# Patient Record
Sex: FEMALE | Race: OTHER | NOT HISPANIC OR LATINO | URBAN - METROPOLITAN AREA
[De-identification: names, ages, dates, MRNs, and addresses within clinical notes are randomized per-mention and may not be internally consistent; named-entity substitution may affect disease eponyms.]

---

## 2021-03-24 ENCOUNTER — INPATIENT (INPATIENT)
Facility: HOSPITAL | Age: 61
LOS: 0 days | Discharge: HOME | DRG: 177 | End: 2021-03-25
Attending: FAMILY MEDICINE | Admitting: HOSPITALIST
Payer: MEDICAID

## 2021-03-24 VITALS
OXYGEN SATURATION: 96 % | TEMPERATURE: 100 F | HEIGHT: 63 IN | HEART RATE: 93 BPM | SYSTOLIC BLOOD PRESSURE: 154 MMHG | RESPIRATION RATE: 22 BRPM | DIASTOLIC BLOOD PRESSURE: 71 MMHG | WEIGHT: 199.96 LBS

## 2021-03-24 DIAGNOSIS — Z98.891 HISTORY OF UTERINE SCAR FROM PREVIOUS SURGERY: Chronic | ICD-10-CM

## 2021-03-24 DIAGNOSIS — Z98.890 OTHER SPECIFIED POSTPROCEDURAL STATES: Chronic | ICD-10-CM

## 2021-03-24 DIAGNOSIS — R74.01 ELEVATION OF LEVELS OF LIVER TRANSAMINASE LEVELS: ICD-10-CM

## 2021-03-24 DIAGNOSIS — J96.01 ACUTE RESPIRATORY FAILURE WITH HYPOXIA: ICD-10-CM

## 2021-03-24 DIAGNOSIS — U07.1 COVID-19: ICD-10-CM

## 2021-03-24 DIAGNOSIS — E03.9 HYPOTHYROIDISM, UNSPECIFIED: ICD-10-CM

## 2021-03-24 DIAGNOSIS — Z29.9 ENCOUNTER FOR PROPHYLACTIC MEASURES, UNSPECIFIED: ICD-10-CM

## 2021-03-24 DIAGNOSIS — Z90.49 ACQUIRED ABSENCE OF OTHER SPECIFIED PARTS OF DIGESTIVE TRACT: Chronic | ICD-10-CM

## 2021-03-24 DIAGNOSIS — Z98.49 CATARACT EXTRACTION STATUS, UNSPECIFIED EYE: Chronic | ICD-10-CM

## 2021-03-24 DIAGNOSIS — J45.909 UNSPECIFIED ASTHMA, UNCOMPLICATED: ICD-10-CM

## 2021-03-24 LAB
ALBUMIN SERPL ELPH-MCNC: 3.3 G/DL — SIGNIFICANT CHANGE UP (ref 3.3–5)
ALBUMIN SERPL ELPH-MCNC: 3.5 G/DL — SIGNIFICANT CHANGE UP (ref 3.3–5)
ALP SERPL-CCNC: 70 U/L — SIGNIFICANT CHANGE UP (ref 40–120)
ALP SERPL-CCNC: 70 U/L — SIGNIFICANT CHANGE UP (ref 40–120)
ALT FLD-CCNC: 87 U/L — HIGH (ref 10–45)
ALT FLD-CCNC: SIGNIFICANT CHANGE UP U/L (ref 10–45)
ANION GAP SERPL CALC-SCNC: 11 MMOL/L — SIGNIFICANT CHANGE UP (ref 5–17)
ANION GAP SERPL CALC-SCNC: 13 MMOL/L — SIGNIFICANT CHANGE UP (ref 5–17)
APTT BLD: 31 SEC — SIGNIFICANT CHANGE UP (ref 27.5–35.5)
AST SERPL-CCNC: 58 U/L — HIGH (ref 10–40)
AST SERPL-CCNC: SIGNIFICANT CHANGE UP U/L (ref 10–40)
BASE EXCESS BLDV CALC-SCNC: 5 MMOL/L — SIGNIFICANT CHANGE UP
BASOPHILS # BLD AUTO: 0.01 K/UL — SIGNIFICANT CHANGE UP (ref 0–0.2)
BASOPHILS NFR BLD AUTO: 0.1 % — SIGNIFICANT CHANGE UP (ref 0–2)
BILIRUB SERPL-MCNC: 0.6 MG/DL — SIGNIFICANT CHANGE UP (ref 0.2–1.2)
BILIRUB SERPL-MCNC: 0.7 MG/DL — SIGNIFICANT CHANGE UP (ref 0.2–1.2)
BUN SERPL-MCNC: 5 MG/DL — LOW (ref 7–23)
BUN SERPL-MCNC: 5 MG/DL — LOW (ref 7–23)
CA-I SERPL-SCNC: 1.13 MMOL/L — SIGNIFICANT CHANGE UP (ref 1.12–1.3)
CALCIUM SERPL-MCNC: 8.8 MG/DL — SIGNIFICANT CHANGE UP (ref 8.4–10.5)
CALCIUM SERPL-MCNC: 9.1 MG/DL — SIGNIFICANT CHANGE UP (ref 8.4–10.5)
CHLORIDE SERPL-SCNC: 100 MMOL/L — SIGNIFICANT CHANGE UP (ref 96–108)
CHLORIDE SERPL-SCNC: 97 MMOL/L — SIGNIFICANT CHANGE UP (ref 96–108)
CO2 SERPL-SCNC: 26 MMOL/L — SIGNIFICANT CHANGE UP (ref 22–31)
CO2 SERPL-SCNC: 29 MMOL/L — SIGNIFICANT CHANGE UP (ref 22–31)
CREAT SERPL-MCNC: 0.48 MG/DL — LOW (ref 0.5–1.3)
CREAT SERPL-MCNC: 0.52 MG/DL — SIGNIFICANT CHANGE UP (ref 0.5–1.3)
CRP SERPL-MCNC: 249.8 MG/L — HIGH (ref 0–4)
D DIMER BLD IA.RAPID-MCNC: 361 NG/ML DDU — HIGH
EOSINOPHIL # BLD AUTO: 0.06 K/UL — SIGNIFICANT CHANGE UP (ref 0–0.5)
EOSINOPHIL NFR BLD AUTO: 0.9 % — SIGNIFICANT CHANGE UP (ref 0–6)
FERRITIN SERPL-MCNC: 1088 NG/ML — HIGH (ref 15–150)
GAS PNL BLDV: 138 MMOL/L — SIGNIFICANT CHANGE UP (ref 138–146)
GAS PNL BLDV: SIGNIFICANT CHANGE UP
GLUCOSE SERPL-MCNC: 101 MG/DL — HIGH (ref 70–99)
GLUCOSE SERPL-MCNC: 120 MG/DL — HIGH (ref 70–99)
HCO3 BLDV-SCNC: 30 MMOL/L — HIGH (ref 20–27)
HCT VFR BLD CALC: 36.3 % — SIGNIFICANT CHANGE UP (ref 34.5–45)
HGB BLD-MCNC: 11.9 G/DL — SIGNIFICANT CHANGE UP (ref 11.5–15.5)
IMM GRANULOCYTES NFR BLD AUTO: 0.4 % — SIGNIFICANT CHANGE UP (ref 0–1.5)
INR BLD: 1.34 — HIGH (ref 0.88–1.16)
LACTATE SERPL-SCNC: 1.6 MMOL/L — SIGNIFICANT CHANGE UP (ref 0.5–2)
LDH SERPL L TO P-CCNC: SIGNIFICANT CHANGE UP U/L (ref 50–242)
LYMPHOCYTES # BLD AUTO: 0.75 K/UL — LOW (ref 1–3.3)
LYMPHOCYTES # BLD AUTO: 11.2 % — LOW (ref 13–44)
MCHC RBC-ENTMCNC: 28.7 PG — SIGNIFICANT CHANGE UP (ref 27–34)
MCHC RBC-ENTMCNC: 32.8 GM/DL — SIGNIFICANT CHANGE UP (ref 32–36)
MCV RBC AUTO: 87.5 FL — SIGNIFICANT CHANGE UP (ref 80–100)
MONOCYTES # BLD AUTO: 0.51 K/UL — SIGNIFICANT CHANGE UP (ref 0–0.9)
MONOCYTES NFR BLD AUTO: 7.6 % — SIGNIFICANT CHANGE UP (ref 2–14)
NEUTROPHILS # BLD AUTO: 5.35 K/UL — SIGNIFICANT CHANGE UP (ref 1.8–7.4)
NEUTROPHILS NFR BLD AUTO: 79.8 % — HIGH (ref 43–77)
NRBC # BLD: 0 /100 WBCS — SIGNIFICANT CHANGE UP (ref 0–0)
NT-PROBNP SERPL-SCNC: 36 PG/ML — SIGNIFICANT CHANGE UP (ref 0–300)
PCO2 BLDV: 44 MMHG — SIGNIFICANT CHANGE UP (ref 41–51)
PH BLDV: 7.44 — HIGH (ref 7.32–7.43)
PLATELET # BLD AUTO: 293 K/UL — SIGNIFICANT CHANGE UP (ref 150–400)
PO2 BLDV: 20 MMHG — SIGNIFICANT CHANGE UP
POTASSIUM BLDV-SCNC: 4.1 MMOL/L — SIGNIFICANT CHANGE UP (ref 3.5–4.9)
POTASSIUM SERPL-MCNC: 4.4 MMOL/L — SIGNIFICANT CHANGE UP (ref 3.5–5.3)
POTASSIUM SERPL-MCNC: SIGNIFICANT CHANGE UP MMOL/L (ref 3.5–5.3)
POTASSIUM SERPL-SCNC: 4.4 MMOL/L — SIGNIFICANT CHANGE UP (ref 3.5–5.3)
POTASSIUM SERPL-SCNC: SIGNIFICANT CHANGE UP MMOL/L (ref 3.5–5.3)
PROCALCITONIN SERPL-MCNC: 0.14 NG/ML — HIGH (ref 0.02–0.1)
PROT SERPL-MCNC: 7.4 G/DL — SIGNIFICANT CHANGE UP (ref 6–8.3)
PROT SERPL-MCNC: 8 G/DL — SIGNIFICANT CHANGE UP (ref 6–8.3)
PROTHROM AB SERPL-ACNC: 15.9 SEC — HIGH (ref 10.6–13.6)
RBC # BLD: 4.15 M/UL — SIGNIFICANT CHANGE UP (ref 3.8–5.2)
RBC # FLD: 12.5 % — SIGNIFICANT CHANGE UP (ref 10.3–14.5)
SAO2 % BLDV: 28 % — SIGNIFICANT CHANGE UP
SARS-COV-2 RNA SPEC QL NAA+PROBE: DETECTED
SARS-COV-2 RNA SPEC QL NAA+PROBE: POSITIVE
SODIUM SERPL-SCNC: 137 MMOL/L — SIGNIFICANT CHANGE UP (ref 135–145)
SODIUM SERPL-SCNC: 139 MMOL/L — SIGNIFICANT CHANGE UP (ref 135–145)
TROPONIN T SERPL-MCNC: <0.01 NG/ML — SIGNIFICANT CHANGE UP (ref 0–0.01)
WBC # BLD: 6.71 K/UL — SIGNIFICANT CHANGE UP (ref 3.8–10.5)
WBC # FLD AUTO: 6.71 K/UL — SIGNIFICANT CHANGE UP (ref 3.8–10.5)

## 2021-03-24 PROCEDURE — 71045 X-RAY EXAM CHEST 1 VIEW: CPT | Mod: 26

## 2021-03-24 PROCEDURE — 99223 1ST HOSP IP/OBS HIGH 75: CPT | Mod: GC

## 2021-03-24 PROCEDURE — 93010 ELECTROCARDIOGRAM REPORT: CPT

## 2021-03-24 PROCEDURE — 99285 EMERGENCY DEPT VISIT HI MDM: CPT | Mod: 25

## 2021-03-24 RX ORDER — SODIUM CHLORIDE 9 MG/ML
500 INJECTION INTRAMUSCULAR; INTRAVENOUS; SUBCUTANEOUS ONCE
Refills: 0 | Status: COMPLETED | OUTPATIENT
Start: 2021-03-24 | End: 2021-03-24

## 2021-03-24 RX ORDER — REMDESIVIR 5 MG/ML
200 INJECTION INTRAVENOUS EVERY 24 HOURS
Refills: 0 | Status: COMPLETED | OUTPATIENT
Start: 2021-03-24 | End: 2021-03-24

## 2021-03-24 RX ORDER — IPRATROPIUM/ALBUTEROL SULFATE 18-103MCG
3 AEROSOL WITH ADAPTER (GRAM) INHALATION EVERY 6 HOURS
Refills: 0 | Status: DISCONTINUED | OUTPATIENT
Start: 2021-03-24 | End: 2021-03-25

## 2021-03-24 RX ORDER — ALBUTEROL 90 UG/1
2 AEROSOL, METERED ORAL
Qty: 0 | Refills: 0 | DISCHARGE

## 2021-03-24 RX ORDER — REMDESIVIR 5 MG/ML
100 INJECTION INTRAVENOUS EVERY 24 HOURS
Refills: 0 | Status: DISCONTINUED | OUTPATIENT
Start: 2021-03-25 | End: 2021-03-25

## 2021-03-24 RX ORDER — DEXAMETHASONE 0.5 MG/5ML
6 ELIXIR ORAL DAILY
Refills: 0 | Status: DISCONTINUED | OUTPATIENT
Start: 2021-03-25 | End: 2021-03-25

## 2021-03-24 RX ORDER — PANTOPRAZOLE SODIUM 20 MG/1
40 TABLET, DELAYED RELEASE ORAL
Refills: 0 | Status: DISCONTINUED | OUTPATIENT
Start: 2021-03-24 | End: 2021-03-25

## 2021-03-24 RX ORDER — ACETAMINOPHEN 500 MG
650 TABLET ORAL ONCE
Refills: 0 | Status: COMPLETED | OUTPATIENT
Start: 2021-03-24 | End: 2021-03-24

## 2021-03-24 RX ORDER — TIOTROPIUM BROMIDE 18 UG/1
1 CAPSULE ORAL; RESPIRATORY (INHALATION) DAILY
Refills: 0 | Status: DISCONTINUED | OUTPATIENT
Start: 2021-03-24 | End: 2021-03-25

## 2021-03-24 RX ORDER — DEXAMETHASONE 0.5 MG/5ML
6 ELIXIR ORAL ONCE
Refills: 0 | Status: COMPLETED | OUTPATIENT
Start: 2021-03-24 | End: 2021-03-24

## 2021-03-24 RX ORDER — ALBUTEROL 90 UG/1
1 AEROSOL, METERED ORAL EVERY 4 HOURS
Refills: 0 | Status: DISCONTINUED | OUTPATIENT
Start: 2021-03-24 | End: 2021-03-25

## 2021-03-24 RX ORDER — ENOXAPARIN SODIUM 100 MG/ML
40 INJECTION SUBCUTANEOUS EVERY 12 HOURS
Refills: 0 | Status: DISCONTINUED | OUTPATIENT
Start: 2021-03-24 | End: 2021-03-25

## 2021-03-24 RX ORDER — DEXAMETHASONE 0.5 MG/5ML
6 ELIXIR ORAL DAILY
Refills: 0 | Status: DISCONTINUED | OUTPATIENT
Start: 2021-03-24 | End: 2021-03-24

## 2021-03-24 RX ORDER — FLUTICASONE PROPIONATE 50 MCG
1 SPRAY, SUSPENSION NASAL
Qty: 0 | Refills: 0 | DISCHARGE

## 2021-03-24 RX ORDER — REMDESIVIR 5 MG/ML
INJECTION INTRAVENOUS
Refills: 0 | Status: DISCONTINUED | OUTPATIENT
Start: 2021-03-24 | End: 2021-03-25

## 2021-03-24 RX ADMIN — Medication 100 MILLIGRAM(S): at 12:43

## 2021-03-24 RX ADMIN — PANTOPRAZOLE SODIUM 40 MILLIGRAM(S): 20 TABLET, DELAYED RELEASE ORAL at 15:18

## 2021-03-24 RX ADMIN — SODIUM CHLORIDE 500 MILLILITER(S): 9 INJECTION INTRAMUSCULAR; INTRAVENOUS; SUBCUTANEOUS at 12:32

## 2021-03-24 RX ADMIN — Medication 650 MILLIGRAM(S): at 14:10

## 2021-03-24 RX ADMIN — SODIUM CHLORIDE 500 MILLILITER(S): 9 INJECTION INTRAMUSCULAR; INTRAVENOUS; SUBCUTANEOUS at 11:05

## 2021-03-24 RX ADMIN — Medication 6 MILLIGRAM(S): at 12:14

## 2021-03-24 RX ADMIN — Medication 3 MILLILITER(S): at 23:13

## 2021-03-24 RX ADMIN — Medication 100 MILLIGRAM(S): at 21:57

## 2021-03-24 RX ADMIN — Medication 3 MILLILITER(S): at 18:01

## 2021-03-24 RX ADMIN — REMDESIVIR 500 MILLIGRAM(S): 5 INJECTION INTRAVENOUS at 19:27

## 2021-03-24 RX ADMIN — ENOXAPARIN SODIUM 40 MILLIGRAM(S): 100 INJECTION SUBCUTANEOUS at 14:12

## 2021-03-24 NOTE — H&P ADULT - HISTORY OF PRESENT ILLNESS
59yo F PMhx of COVID+ (dx on 3/16), asthma and hypothyroid (not on any medications) presents w/ generalized weakness and persistent cough x 1week. At home patient has been febrile (tmax 101), had persistent productive cough, abd pain, headache and shortness of breath. Also feels nauseous because of all the coughing, and has had episodes of diarrhea, joint pain. For fever and cough patient have tried tylenol and mucinex and the medications have helped but her symptoms have persisited. Pt has history of asthma and has been utilizing albuterol to help with shortness of breath. Not currently taking steroids. Pt has not received covid vaccine yet. Denies dizziness, chest pain, numbness, tingling, nausea, and vomiting, leg edema or calf pain. Endorses poor PO intake in the last week secondary to cough, nausea and generalized malaise.    In the ED  Vitals T 99.5, HR 93, 154/71, RR 22 O2 sat 96% on room air   Labs D-dimer 361, PT 15.9, INR 1.34, lactate negative, .8, Ferritin 1088, procal 0.14, vbg ph 7.44   EKG NSR  Imaging studies CXR   61yo F PMhx of COVID+ (dx on 3/16), asthma and hypothyroid (not on any medications) presents w/ generalized weakness and persistent cough x 1week. At home patient has been febrile (tmax 101), had persistent productive cough, abd pain, headache and shortness of breath. Also feels nauseous because of all the coughing, and has had episodes of diarrhea, joint pain. For fever and cough patient have tried tylenol and mucinex and the medications have helped but her symptoms have persisited. Pt has history of asthma and has been utilizing albuterol to help with shortness of breath. Not currently taking steroids. Pt has not received covid vaccine yet. Denies dizziness, chest pain, numbness, tingling, nausea, and vomiting, leg edema or calf pain. Endorses poor PO intake in the last week secondary to cough, nausea and generalized malaise.    In the ED  Vitals T 99.5, HR 93, 154/71, RR 22 O2 sat 96% on room air   Labs D-dimer 361, PT 15.9, INR 1.34, lactate negative, .8, Ferritin 1088, procal 0.14, vbg ph 7.44   EKG NSR  Imaging studies CXR    Received 500cc NS, tesslon perle, decadron   61yo F PMhx of COVID+ (dx on 3/16), asthma and hypothyroid (not on any medications) presents w/ generalized weakness and persistent cough x 1week. At home patient has been febrile (tmax 101), had persistent productive cough, abd pain, headache and shortness of breath. Also feels nauseous because of all the coughing, and has had episodes of diarrhea, joint pain. For fever and cough patient have tried tylenol and mucinex and the medications have helped but her symptoms have persisited. Pt has history of asthma and has been utilizing albuterol to help with shortness of breath. Not currently taking steroids. Pt has not received covid vaccine yet. Denies dizziness, chest pain, numbness, tingling, nausea, and vomiting, leg edema or calf pain. Endorses poor PO intake in the last week secondary to cough, nausea and generalized malaise.    In the ED  Vitals T 99.5, HR 93, 154/71, RR 22 O2 sat 96% on room air   Labs D-dimer 361, PT 15.9, INR 1.34, lactate negative, .8, Ferritin 1088, procal 0.14, vbg ph 7.44   EKG NSR    Imaging studies  CXR with bilateral opacities, concerning for COVID-19. normal bony structures, no pneumothorax    Received 500cc NS, tesslon perle, decadron   61yo F PMhx of COVID+ (dx on 3/16), asthma and hypothyroid (not on any medications) presents w/ generalized weakness and persistent cough x 1week. At home patient has been febrile (tmax 101), had persistent productive cough, abd pain, headache and shortness of breath. Also feels nauseous because of all the coughing, and has had episodes of diarrhea, joint pain. For fever and cough patient have tried tylenol and mucinex and the medications have helped but her symptoms have persisited. Pt has history of asthma and has been utilizing albuterol to help with shortness of breath. Not currently taking steroids. Pt has not received covid vaccine yet. Denies dizziness, chest pain, numbness, tingling, leg edema or calf pain. Endorses poor PO intake in the last week secondary to cough, nausea and generalized malaise.    In the ED  Vitals T 99.5, HR 93, 154/71, RR 22 O2 sat 96% on room air   Labs D-dimer 361, PT 15.9, INR 1.34, lactate negative, .8, Ferritin 1088, procal 0.14, vbg ph 7.44   EKG NSR    Imaging studies  CXR with bilateral opacities, concerning for COVID-19. normal bony structures, no pneumothorax    Received 500cc NS, tesslon perle, decadron   59yo F PMhx of COVID+ (dx on 3/16), asthma and hypothyroid (not on any medications) presents w/ generalized weakness and persistent cough x 1week. At home patient has been febrile (tmax 101), had persistent productive cough, abd pain, headache and shortness of breath. Also feels nauseous because of all the coughing, and has had episodes of diarrhea, joint pain. For fever and cough patient have tried tylenol and mucinex and the medications have helped but her symptoms have persisited. Pt has history of asthma and has been utilizing albuterol to help with shortness of breath. Not currently taking steroids. Pt has not received covid vaccine yet. Denies dizziness, chest pain, numbness, tingling, leg edema or calf pain. Endorses poor PO intake in the last week secondary to cough, nausea and generalized malaise.    In the ED  Vitals T 99.5, HR 93, 154/71, RR 22 O2 sat 92-96% on room air   Labs D-dimer 361, PT 15.9, INR 1.34, lactate negative, .8, Ferritin 1088, procal 0.14, vbg ph 7.44   EKG NSR    Imaging studies  CXR with bilateral opacities, concerning for COVID-19. normal bony structures, no pneumothorax    Received 500cc NS, tesslon perle, decadron

## 2021-03-24 NOTE — ED ADULT NURSE NOTE - OBJECTIVE STATEMENT
Presents to ED complaining of generalized weakness.  PT reports testing positive for covid on 3/16 with daughter.  Since then patient has been experiencing fevers (tmax 101), dry cough, abd pain, headache and shortness of breath.  pt has history of asthma and has been utilizing albuterol to help with shortness of breath.  Pt has not received covid vaccine yet.  Denies dizziness, chest pain, numbness, tingling, nausea, and vomiting.

## 2021-03-24 NOTE — H&P ADULT - PROBLEM SELECTOR PLAN 6
F: tolerating PO, no IVF  E: replete K<4, Mg<2  N: Regular  VTE Prophylaxis: Lovenox 40 Q12H  GI: PPI  C: Full Code  D: F

## 2021-03-24 NOTE — H&P ADULT - PROBLEM SELECTOR PLAN 1
COVID + 3/16. Worsening sx since diagnosis especially persistent productive cough. Low suspiciion for superimposed bacterial pna given low procal and lack of focal consolidation on xray, and not leukocytosis. O2 saturation is 92% on Room air but desaturated to 86% with ambulation. Meets criteria for decadron and remdesivir. Placed on NC.  - c/w decadron  - c/w remdesivir  - c/w supplemental O2  - tesslon perle and benzoate for cough  - monitor O2 status  - daily covid labs normal (ped)... COVID + 3/16. Worsening sx since diagnosis especially persistent productive cough. Low suspiciion for superimposed bacterial pna given low procal and lack of focal consolidation on xray, and not leukocytosis. O2 saturation is 92% on Room air but desaturated to 86% with ambulation. Meets criteria for decadron and remdesivir. Placed on 2L NC.  - c/w decadron (3/24- )  - c/w PPI while on decadron  - c/w remdesivir (3/24- )  - c/w supplemental O2  - tesslon perle and benzoate for cough  - monitor O2 status  - daily covid labs

## 2021-03-24 NOTE — H&P ADULT - NSICDXPASTSURGICALHX_GEN_ALL_CORE_FT
PAST SURGICAL HISTORY:  H/O  section     History of cataract surgery     S/P appendectomy     S/P hernia repair x2

## 2021-03-24 NOTE — ED PROVIDER NOTE - OBJECTIVE STATEMENT
59yo F hx of asthma, here w/ complaint of generalized weakness in setting of testing positive for covid on 3/16 with daughter.  Since then patient has been experiencing fevers (tmax 101), dry cough, abd pain, headache and shortness of breath.  pt has history of asthma and has been utilizing albuterol to help with shortness of breath. Not currently taking steroids. No wheezing at time of evaluation.   Pt has not received covid vaccine yet.  Denies dizziness, chest pain, numbness, tingling, nausea, and vomiting, leg edema or calf pain. +SOB and dyspnea with ambulation around apt  · Presenting Symptoms: COUGH, DIFFICULTY BREATHING, DYSPNEA ON EXERTION, FEVER, headache  · Negative Findings: no body aches, no chest pain, no edema, no wheezing  · Timing: gradual onset  · Duration: day(s)  · Severity: PAIN SCALE 5 OF 10.  · Context: covid  · Aggravated Factors: walking  · Relieving Factors: over the counter medication, rest

## 2021-03-24 NOTE — ED ADULT NURSE NOTE - NSIMPLEMENTINTERV_GEN_ALL_ED
Implemented All Universal Safety Interventions:  [employer] to call system. Call bell, personal items and telephone within reach. Instruct patient to call for assistance. Room bathroom lighting operational. Non-slip footwear when patient is off stretcher. Physically safe environment: no spills, clutter or unnecessary equipment. Stretcher in lowest position, wheels locked, appropriate side rails in place.

## 2021-03-24 NOTE — ED PROVIDER NOTE - PHYSICAL EXAMINATION
CONSTITUTIONAL: Well-appearing; well-nourished; in no apparent distress.   HEAD: Normocephalic; atraumatic.   EYES:  conjunctiva and sclera clear  ENT: normal nose; no rhinorrhea;  NECK: Supple; full ROM  RESPIRATORY: Breathing easily; no resp difficulty, no wheezing heard  EXT: No cyanosis or edema;  SKIN: Normal for age and race; warm; dry; good turgor; no apparent lesions or rash.   NEURO: A & O x 3; face symmetric; grossly unremarkable.   PSYCHOLOGICAL: The patient’s mood and manner are appropriate.

## 2021-03-24 NOTE — H&P ADULT - NSHPSOCIALHISTORY_GEN_ALL_CORE
Lives at home with daughter who also has covid. Denies any tobacco, alcohol or recreational drug use.

## 2021-03-24 NOTE — H&P ADULT - PROBLEM SELECTOR PLAN 4
Per patient has been told she has hypothyroidism but is not on any medications. Follows up with primary care doctor.   - No active issues Per patient has been told she has hypothyroidism but is not on any medications. Follows closely with primary care doctor.   - No active issues Diagnosed in adulthood. No hospitalizations or intubations. Uses albuterol as needed.   - c/w ashleigh

## 2021-03-24 NOTE — H&P ADULT - NSHPLABSRESULTS_GEN_ALL_CORE
LABS:                        11.9   6.71  )-----------( 293      ( 24 Mar 2021 11:22 )             36.3     03-24    137  |  97  |  5<L>  ----------------------------<  120<H>  See note   |  29  |  0.48<L>    Ca    9.1      24 Mar 2021 11:22    TPro  8.0  /  Alb  3.5  /  TBili  0.7  /  DBili  x   /  AST  See note  /  ALT  See note  /  AlkPhos  70  03-24      PT/INR - ( 24 Mar 2021 11:22 )   PT: 15.9 sec;   INR: 1.34          PTT - ( 24 Mar 2021 11:22 )  PTT:31.0 sec  CAPILLARY BLOOD GLUCOSE          CARDIAC MARKERS ( 24 Mar 2021 11:22 )  x     / <0.01 ng/mL / x     / x     / x                    LIVER FUNCTIONS - ( 24 Mar 2021 11:22 )  Alb: 3.5 g/dL / Pro: 8.0 g/dL / ALK PHOS: 70 U/L / ALT: See note U/L / AST: See note U/L / GGT: x                     I & O Summary:    03-24-21 @ 07:01  -  03-24-21 @ 13:43  --------------------------------------------------------  IN: 500 mL / OUT: 0 mL / NET: 500 mL        Microbiology:        RADIOLOGY, EKG AND ADDITIONAL TESTS: Reviewed.

## 2021-03-24 NOTE — H&P ADULT - PROBLEM SELECTOR PLAN 3
Diagnosed in adulthood. No hospitalizations or intubations. Uses albuterol as needed.   - c/w ashleigh Mild transaminitis on admission - AST/ALT 58/87. ALP wnl. Likely 2/2 COVID as patient with no other sx that would suggest different etiology for transaminitis.   - closely monitor LFTs while on remdesevir - cut off to stop medication is LFTs 10 ULN  - f/u hepatitis panel  - consider HIV screen

## 2021-03-24 NOTE — ED ADULT TRIAGE NOTE - CHIEF COMPLAINT QUOTE
Pt requesting antibody infusion. Pt covid+ on 3/16/2021. Pt c/o cough associated w/ fever and generalized weakness. Denies chills, SOB, CP, NVD, loss of taste or smell. Pt w/ hx of asthma.

## 2021-03-24 NOTE — H&P ADULT - PROBLEM SELECTOR PLAN 5
F: tolerating PO, no IVF  E: replete K<4, Mg<2  N: Regular  VTE Prophylaxis: Lovenox 40 Q12H  GI: not needed  C: Full Code  D: RMF F: tolerating PO, no IVF  E: replete K<4, Mg<2  N: Regular  VTE Prophylaxis: Lovenox 40 Q12H  GI: PPI  C: Full Code  D: F Per patient has been told she has hypothyroidism but is not on any medications. Follows closely with primary care doctor.   - No active issues

## 2021-03-24 NOTE — ED ADULT NURSE REASSESSMENT NOTE - NS ED NURSE REASSESS COMMENT FT1
PT saturation is 92% on Room air.  pt was walked around department to see what ambulatory sat, found to be 86%.  PT complaining of worsening shortness of breath when ambulatory.

## 2021-03-24 NOTE — ED ADULT NURSE NOTE - SEPSIS REFERENCE DATA CRITERIA 1
will order tylenol for fever. continue to monitor. Give IV tylenol and continue to monitor. Abormal VS: Temp > 100F or < 96.8F; SBP < 90 mmHG; HR > 120bpm; Resp > 24/min

## 2021-03-24 NOTE — ED PROVIDER NOTE - CLINICAL SUMMARY MEDICAL DECISION MAKING FREE TEXT BOX
here on day 8 of COVID-19 infection, found to be hypoxic at rest (90-92% with no smoking hx) and further hypoxia with ambulation to 86%. Will plan to send covid-19 labs, and admission for decadron, oxygen, monitoring, and consideration for advanced therapeutics

## 2021-03-24 NOTE — H&P ADULT - NSHPPHYSICALEXAM_GEN_ALL_CORE
PHYSICAL EXAM:  GENERAL: NAD, speaks in full sentences, no signs of respiratory distress  HEAD:  Atraumatic, Normocephalic  EYES: conjunctiva and sclera clear  NECK: Supple, No JVD  CHEST/LUNG: coarse breath sounds bilaterally; No wheeze; No crackles; No accessory muscles used  HEART: Regular rate and rhythm; No murmurs;   ABDOMEN: Soft, Nontender, Nondistended; Bowel sounds present; No guarding  EXTREMITIES:  2+ Peripheral Pulses, No cyanosis or edema  PSYCH: AAOx3  NEUROLOGY: non-focal  SKIN: No rashes or lesions PHYSICAL EXAM:  GENERAL: NAD, speaks in full sentences, no signs of respiratory distress  HEAD:  Atraumatic, Normocephalic  EYES: conjunctiva and sclera clear  NECK: Supple, No JVD  CHEST/LUNG: coarse breath sounds bilaterally; No wheeze; No crackles; No accessory muscles used  HEART: Regular rate and rhythm; No murmurs;   ABDOMEN: Soft, Nontender, distended; Bowel sounds present; No guarding  EXTREMITIES:  2+ Peripheral Pulses, No cyanosis or edema  PSYCH: AAOx3  NEUROLOGY: non-focal  SKIN: No rashes or lesions

## 2021-03-24 NOTE — H&P ADULT - ATTENDING COMMENTS
Agree with plan and exam as document by resident above.     -will f/u with LFTs to initiate remdesevir  -continue decadron and GI ppx   -continue to trend covid inflammatory labs and wean oxygen as tolerated

## 2021-03-24 NOTE — H&P ADULT - ASSESSMENT
59yo F PMhx of COVID+ (dx on 3/16), asthma and hypothyroid (not on any medications) presents w/ generalized weakness and persistent cough x 1week found to have acute hypoxic respiratory failure 2/2 COVID.

## 2021-03-25 VITALS — TEMPERATURE: 98 F

## 2021-03-25 LAB
ALBUMIN SERPL ELPH-MCNC: 3.4 G/DL — SIGNIFICANT CHANGE UP (ref 3.3–5)
ALP SERPL-CCNC: 86 U/L — SIGNIFICANT CHANGE UP (ref 40–120)
ALT FLD-CCNC: 111 U/L — HIGH (ref 10–45)
ANION GAP SERPL CALC-SCNC: 12 MMOL/L — SIGNIFICANT CHANGE UP (ref 5–17)
APTT BLD: 34.5 SEC — SIGNIFICANT CHANGE UP (ref 27.5–35.5)
AST SERPL-CCNC: 63 U/L — HIGH (ref 10–40)
BASOPHILS # BLD AUTO: 0.01 K/UL — SIGNIFICANT CHANGE UP (ref 0–0.2)
BASOPHILS NFR BLD AUTO: 0.1 % — SIGNIFICANT CHANGE UP (ref 0–2)
BILIRUB SERPL-MCNC: 0.5 MG/DL — SIGNIFICANT CHANGE UP (ref 0.2–1.2)
BUN SERPL-MCNC: 10 MG/DL — SIGNIFICANT CHANGE UP (ref 7–23)
CALCIUM SERPL-MCNC: 9.2 MG/DL — SIGNIFICANT CHANGE UP (ref 8.4–10.5)
CHLORIDE SERPL-SCNC: 102 MMOL/L — SIGNIFICANT CHANGE UP (ref 96–108)
CO2 SERPL-SCNC: 25 MMOL/L — SIGNIFICANT CHANGE UP (ref 22–31)
COVID-19 SPIKE DOMAIN AB INTERP: POSITIVE
COVID-19 SPIKE DOMAIN ANTIBODY RESULT: 26.3 U/ML — HIGH
CREAT SERPL-MCNC: 0.49 MG/DL — LOW (ref 0.5–1.3)
CRP SERPL-MCNC: 268.7 MG/L — HIGH (ref 0–4)
D DIMER BLD IA.RAPID-MCNC: 417 NG/ML DDU — HIGH
EOSINOPHIL # BLD AUTO: 0 K/UL — SIGNIFICANT CHANGE UP (ref 0–0.5)
EOSINOPHIL NFR BLD AUTO: 0 % — SIGNIFICANT CHANGE UP (ref 0–6)
FERRITIN SERPL-MCNC: 1145 NG/ML — HIGH (ref 15–150)
GLUCOSE SERPL-MCNC: 121 MG/DL — HIGH (ref 70–99)
HAV IGM SER-ACNC: SIGNIFICANT CHANGE UP
HBV CORE AB SER-ACNC: SIGNIFICANT CHANGE UP
HBV CORE IGM SER-ACNC: SIGNIFICANT CHANGE UP
HBV SURFACE AB SER-ACNC: SIGNIFICANT CHANGE UP
HBV SURFACE AG SER-ACNC: SIGNIFICANT CHANGE UP
HCT VFR BLD CALC: 36.6 % — SIGNIFICANT CHANGE UP (ref 34.5–45)
HCV AB S/CO SERPL IA: 0.14 S/CO — SIGNIFICANT CHANGE UP
HCV AB SERPL-IMP: SIGNIFICANT CHANGE UP
HGB BLD-MCNC: 11.8 G/DL — SIGNIFICANT CHANGE UP (ref 11.5–15.5)
IMM GRANULOCYTES NFR BLD AUTO: 0.7 % — SIGNIFICANT CHANGE UP (ref 0–1.5)
INR BLD: 1.19 — HIGH (ref 0.88–1.16)
LYMPHOCYTES # BLD AUTO: 0.77 K/UL — LOW (ref 1–3.3)
LYMPHOCYTES # BLD AUTO: 11 % — LOW (ref 13–44)
MAGNESIUM SERPL-MCNC: 2.5 MG/DL — SIGNIFICANT CHANGE UP (ref 1.6–2.6)
MCHC RBC-ENTMCNC: 28.2 PG — SIGNIFICANT CHANGE UP (ref 27–34)
MCHC RBC-ENTMCNC: 32.2 GM/DL — SIGNIFICANT CHANGE UP (ref 32–36)
MCV RBC AUTO: 87.6 FL — SIGNIFICANT CHANGE UP (ref 80–100)
MONOCYTES # BLD AUTO: 0.57 K/UL — SIGNIFICANT CHANGE UP (ref 0–0.9)
MONOCYTES NFR BLD AUTO: 8.1 % — SIGNIFICANT CHANGE UP (ref 2–14)
NEUTROPHILS # BLD AUTO: 5.6 K/UL — SIGNIFICANT CHANGE UP (ref 1.8–7.4)
NEUTROPHILS NFR BLD AUTO: 80.1 % — HIGH (ref 43–77)
NRBC # BLD: 0 /100 WBCS — SIGNIFICANT CHANGE UP (ref 0–0)
PHOSPHATE SERPL-MCNC: 3.5 MG/DL — SIGNIFICANT CHANGE UP (ref 2.5–4.5)
PLATELET # BLD AUTO: 348 K/UL — SIGNIFICANT CHANGE UP (ref 150–400)
POTASSIUM SERPL-MCNC: 3.8 MMOL/L — SIGNIFICANT CHANGE UP (ref 3.5–5.3)
POTASSIUM SERPL-SCNC: 3.8 MMOL/L — SIGNIFICANT CHANGE UP (ref 3.5–5.3)
PROT SERPL-MCNC: 7.8 G/DL — SIGNIFICANT CHANGE UP (ref 6–8.3)
PROTHROM AB SERPL-ACNC: 14.2 SEC — HIGH (ref 10.6–13.6)
RBC # BLD: 4.18 M/UL — SIGNIFICANT CHANGE UP (ref 3.8–5.2)
RBC # FLD: 12.6 % — SIGNIFICANT CHANGE UP (ref 10.3–14.5)
SARS-COV-2 IGG+IGM SERPL QL IA: 26.3 U/ML — HIGH
SARS-COV-2 IGG+IGM SERPL QL IA: POSITIVE
SODIUM SERPL-SCNC: 139 MMOL/L — SIGNIFICANT CHANGE UP (ref 135–145)
WBC # BLD: 7 K/UL — SIGNIFICANT CHANGE UP (ref 3.8–10.5)
WBC # FLD AUTO: 7 K/UL — SIGNIFICANT CHANGE UP (ref 3.8–10.5)

## 2021-03-25 PROCEDURE — 85730 THROMBOPLASTIN TIME PARTIAL: CPT

## 2021-03-25 PROCEDURE — 84295 ASSAY OF SERUM SODIUM: CPT

## 2021-03-25 PROCEDURE — 83735 ASSAY OF MAGNESIUM: CPT

## 2021-03-25 PROCEDURE — 86140 C-REACTIVE PROTEIN: CPT

## 2021-03-25 PROCEDURE — 99285 EMERGENCY DEPT VISIT HI MDM: CPT | Mod: 25

## 2021-03-25 PROCEDURE — 36415 COLL VENOUS BLD VENIPUNCTURE: CPT

## 2021-03-25 PROCEDURE — 86705 HEP B CORE ANTIBODY IGM: CPT

## 2021-03-25 PROCEDURE — 87635 SARS-COV-2 COVID-19 AMP PRB: CPT

## 2021-03-25 PROCEDURE — 84100 ASSAY OF PHOSPHORUS: CPT

## 2021-03-25 PROCEDURE — 82330 ASSAY OF CALCIUM: CPT

## 2021-03-25 PROCEDURE — U0003: CPT

## 2021-03-25 PROCEDURE — 99232 SBSQ HOSP IP/OBS MODERATE 35: CPT

## 2021-03-25 PROCEDURE — U0005: CPT

## 2021-03-25 PROCEDURE — 86769 SARS-COV-2 COVID-19 ANTIBODY: CPT

## 2021-03-25 PROCEDURE — 83880 ASSAY OF NATRIURETIC PEPTIDE: CPT

## 2021-03-25 PROCEDURE — 82728 ASSAY OF FERRITIN: CPT

## 2021-03-25 PROCEDURE — 84132 ASSAY OF SERUM POTASSIUM: CPT

## 2021-03-25 PROCEDURE — 86704 HEP B CORE ANTIBODY TOTAL: CPT

## 2021-03-25 PROCEDURE — 94640 AIRWAY INHALATION TREATMENT: CPT

## 2021-03-25 PROCEDURE — 87040 BLOOD CULTURE FOR BACTERIA: CPT

## 2021-03-25 PROCEDURE — 85025 COMPLETE CBC W/AUTO DIFF WBC: CPT

## 2021-03-25 PROCEDURE — 82803 BLOOD GASES ANY COMBINATION: CPT

## 2021-03-25 PROCEDURE — 84145 PROCALCITONIN (PCT): CPT

## 2021-03-25 PROCEDURE — 84484 ASSAY OF TROPONIN QUANT: CPT

## 2021-03-25 PROCEDURE — 83615 LACTATE (LD) (LDH) ENZYME: CPT

## 2021-03-25 PROCEDURE — 86709 HEPATITIS A IGM ANTIBODY: CPT

## 2021-03-25 PROCEDURE — 96360 HYDRATION IV INFUSION INIT: CPT

## 2021-03-25 PROCEDURE — 87340 HEPATITIS B SURFACE AG IA: CPT

## 2021-03-25 PROCEDURE — 85379 FIBRIN DEGRADATION QUANT: CPT

## 2021-03-25 PROCEDURE — 80053 COMPREHEN METABOLIC PANEL: CPT

## 2021-03-25 PROCEDURE — 71045 X-RAY EXAM CHEST 1 VIEW: CPT

## 2021-03-25 PROCEDURE — 83605 ASSAY OF LACTIC ACID: CPT

## 2021-03-25 PROCEDURE — 85610 PROTHROMBIN TIME: CPT

## 2021-03-25 PROCEDURE — 86706 HEP B SURFACE ANTIBODY: CPT

## 2021-03-25 PROCEDURE — 86803 HEPATITIS C AB TEST: CPT

## 2021-03-25 RX ADMIN — ENOXAPARIN SODIUM 40 MILLIGRAM(S): 100 INJECTION SUBCUTANEOUS at 05:31

## 2021-03-25 RX ADMIN — PANTOPRAZOLE SODIUM 40 MILLIGRAM(S): 20 TABLET, DELAYED RELEASE ORAL at 05:31

## 2021-03-25 RX ADMIN — Medication 6 MILLIGRAM(S): at 05:31

## 2021-03-25 RX ADMIN — Medication 100 MILLIGRAM(S): at 14:35

## 2021-03-25 RX ADMIN — Medication 3 MILLILITER(S): at 05:31

## 2021-03-25 RX ADMIN — Medication 100 MILLIGRAM(S): at 05:32

## 2021-03-25 NOTE — PROGRESS NOTE ADULT - PROBLEM SELECTOR PLAN 3
Mild transaminitis on admission - AST/ALT 58/87. ALP wnl. Likely 2/2 COVID as patient with no other sx that would suggest different etiology for transaminitis.   - closely monitor LFTs while on remdesevir - cut off to stop medication is LFTs 10 ULN  - f/u hepatitis panel  - consider HIV screen

## 2021-03-25 NOTE — DISCHARGE NOTE NURSING/CASE MANAGEMENT/SOCIAL WORK - NSDCFUADDAPPT_GEN_ALL_CORE_FT
Please call [phone] to schedule a follow-up appointment with your Primary Care Physician, Dr. Dakota Victoria, within 1-2 weeks after you leave the hospital.

## 2021-03-25 NOTE — PROGRESS NOTE ADULT - ATTENDING COMMENTS
Pt with no sig pmh admitted for AHRF due to covid and is better with remdesevir and steroids and is better and medically cleared for d/c     Assesment  AHRF due to covid      Plan    Rx with  Remdesevir as per hospital protocol after pt consented for its use as EUA  FDA factsheet provided  continue Dexamethasone as per hospital protocol  cont ppi and monitor fingersticks  cont dvt prophylaxis    encourage incentive spirometry/Proning as tolerated  Taper o2 as tolerated  PT eval and discharge planning with Home o2 to Home   Plan discussed with patient

## 2021-03-25 NOTE — PROGRESS NOTE ADULT - PROBLEM SELECTOR PLAN 4
Diagnosed in adulthood. No hospitalizations or intubations. Uses albuterol as needed.   - c/w ashleigh

## 2021-03-25 NOTE — DISCHARGE NOTE PROVIDER - HOSPITAL COURSE
#Discharge: do not delete    59yo F PMhx of COVID+ (dx on 3/16), asthma and hypothyroid (not on any medications) presents w/ generalized weakness and persistent cough x 1week.    Problem List/Main Diagnoses:     New medications/therapies:   New lines/hardware:  Labs to be followed outpatient:   Exam to be followed outpatient:     Discharge plan: discharge to ______     #Discharge: do not delete    61yo F PMhx of COVID+ (dx on 3/16), asthma and hypothyroid (not on any medications) presents w/ generalized weakness and persistent cough x 1week.    Problem List/Main Diagnoses:   #Acute hypoxic respiratory failure 2/2 COVID-19 infection  OVID + 3/16. Worsening sx since diagnosis especially persistent productive cough. Low suspiciion for superimposed bacterial pna given low procal and lack of focal consolidation on xray, and not leukocytosis. O2 saturation is 92% on Room air but desaturated to 86% with ambulation. Placed on 2L NC. s/p remdesevir (3/24-3/25).  - c/w PO decadron 6mg x 10 days (3/24-4/2)  - c/w PO PPI while on steroids  - will discharge on supplemental home O2  -  tesslon perle and benzoate for cough    #Transaminitis - Mild transaminitis on admission - AST/ALT 58/87. ALP wnl. Likely 2/2 COVID as patient with no other sx that would suggest different etiology for transaminitis.   #Asthma - Diagnosed in adulthood. No hospitalizations or intubations. Uses albuterol as needed  #Hypothyroidism - Per patient has been told she has hypothyroidism but is not on any medications. Follows closely with primary care doctor.     New medications/therapies: decadron 6mg PO x 8 days; PO protonix 40mg x 8 days; supplemental home O2  New lines/hardware: none  Labs to be followed outpatient: none  Exam to be followed outpatient: none    Discharge plan: discharge to home

## 2021-03-25 NOTE — DISCHARGE NOTE NURSING/CASE MANAGEMENT/SOCIAL WORK - PATIENT PORTAL LINK FT
You can access the FollowMyHealth Patient Portal offered by [facility] by registering at the following website: [email or web address]. By joining [facility]’s FollowMyHealth portal, you will also be able to view your health information using other applications (apps) compatible with our system.

## 2021-03-25 NOTE — PROGRESS NOTE ADULT - PROBLEM SELECTOR PLAN 1
COVID + 3/16. Worsening sx since diagnosis especially persistent productive cough. Low suspiciion for superimposed bacterial pna given low procal and lack of focal consolidation on xray, and not leukocytosis. O2 saturation is 92% on Room air but desaturated to 86% with ambulation. Meets criteria for decadron and remdesivir. Placed on 2L NC.  - c/w decadron (3/24- )  - c/w PPI while on decadron  - c/w remdesivir (3/24- )  - c/w supplemental O2  - tesslon perle and benzoate for cough  - monitor O2 status  - daily covid labs

## 2021-03-25 NOTE — PROGRESS NOTE ADULT - SUBJECTIVE AND OBJECTIVE BOX
SUBJECTIVE/OVERNIGHT EVENTS:     VITAL SIGNS:  Vital Signs Last 24 Hrs  T(C): 36.5 (25 Mar 2021 10:19), Max: 38.4 (24 Mar 2021 13:44)  T(F): 97.7 (25 Mar 2021 10:19), Max: 101.1 (24 Mar 2021 13:44)  HR: 75 (25 Mar 2021 09:45) (71 - 90)  BP: 140/80 (25 Mar 2021 09:45) (126/79 - 144/70)  BP(mean): --  RR: 18 (25 Mar 2021 09:45) (18 - 24)  SpO2: 98% (25 Mar 2021 09:45) (93% - 98%)    PHYSICAL EXAM:  General: NAD; speaking in full sentences  HEENT: NC/AT; PERRL; EOMI; MMM  Neck: supple; no JVD  Cardiac: RRR; +S1/S2  Pulm: CTA B/L; no W/R/R  GI: soft, NT/ND, +BS  Extremities: WWP; no edema, clubbing or cyanosis  Vasc: 2+ radial, DP pulses B/L  Neuro: AAOx3; no focal deficits    MEDICATIONS:  MEDICATIONS  (STANDING):  ALBUTerol    90 MICROgram(s) HFA Inhaler 1 Puff(s) Inhalation every 4 hours  albuterol/ipratropium for Nebulization 3 milliLiter(s) Nebulizer every 6 hours  dexAMETHasone     Tablet 6 milliGRAM(s) Oral daily  enoxaparin Injectable 40 milliGRAM(s) SubCutaneous every 12 hours  pantoprazole    Tablet 40 milliGRAM(s) Oral before breakfast  remdesivir  IVPB   IV Intermittent   remdesivir  IVPB 100 milliGRAM(s) IV Intermittent every 24 hours  tiotropium 18 MICROgram(s) Capsule 1 Capsule(s) Inhalation daily    MEDICATIONS  (PRN):  benzonatate 100 milliGRAM(s) Oral every 8 hours PRN Cough      ALLERGIES:  Allergies    avocado (Unknown)  No Known Drug Allergies    Intolerances        LABS:                        11.8   7.00  )-----------( 348      ( 25 Mar 2021 07:06 )             36.6     03-25    139  |  102  |  10  ----------------------------<  121<H>  3.8   |  25  |  0.49<L>    Ca    9.2      25 Mar 2021 07:06  Phos  3.5     03-25  Mg     2.5     03-25    TPro  7.8  /  Alb  3.4  /  TBili  0.5  /  DBili  x   /  AST  63<H>  /  ALT  111<H>  /  AlkPhos  86  03-25    PT/INR - ( 25 Mar 2021 07:06 )   PT: 14.2 sec;   INR: 1.19          PTT - ( 25 Mar 2021 07:06 )  PTT:34.5 sec    RADIOLOGY & ADDITIONAL TESTS: Reviewed.

## 2021-03-25 NOTE — PROGRESS NOTE ADULT - PROBLEM SELECTOR PLAN 5
Per patient has been told she has hypothyroidism but is not on any medications. Follows closely with primary care doctor.   - No active issues

## 2021-03-25 NOTE — DISCHARGE NOTE PROVIDER - NSDCFUADDAPPT_GEN_ALL_CORE_FT
Please call [phone] to schedule a follow-up appointment with your Primary Care Physician, Dr. Dakota Victoria, within 1-2 weeks after you leave the hospital.

## 2021-03-25 NOTE — DISCHARGE NOTE PROVIDER - NSDCMRMEDTOKEN_GEN_ALL_CORE_FT
albuterol 90 mcg/inh inhalation aerosol: 2 puff(s) inhaled every 6 hours, As Needed  Flonase 50 mcg/inh nasal spray: 1 spray(s) nasal once a day, As Needed   albuterol 90 mcg/inh inhalation aerosol: 2 puff(s) inhaled every 6 hours, As Needed  benzonatate 100 mg oral capsule: 1 cap(s) orally every 8 hours, As needed, Cough  dexamethasone 6 mg oral tablet: 1 tab(s) orally once a day  Flonase 50 mcg/inh nasal spray: 1 spray(s) nasal once a day, As Needed  pantoprazole 40 mg oral delayed release tablet: 1 tab(s) orally once a day (before a meal)

## 2021-03-25 NOTE — DISCHARGE NOTE PROVIDER - CARE PROVIDER_API CALL
GHAZALA DAUGHERTY  [address]  [place] [address]  Phone: [phone]  Fax: [phone]  Established Patient  Follow Up Time: 1 week

## 2021-03-25 NOTE — DISCHARGE NOTE PROVIDER - NSDCCPCAREPLAN_GEN_ALL_CORE_FT
PRINCIPAL DISCHARGE DIAGNOSIS  Diagnosis: Dyspnea due to COVID-19  Assessment and Plan of Treatment:        PRINCIPAL DISCHARGE DIAGNOSIS  Diagnosis: Dyspnea due to COVID-19  Assessment and Plan of Treatment: You came to the hospital due to shortness of breath , coughing, and intermittent fevers. You were found to be positive for the COVID-19 infection, and were treated with medications aimed to help manage your symptoms caused by COVID-19. In order to complete your treatment course, please continue to take your Decadron 6mg tablets ONCE EACH DAY for the next 8 days (start 3/26; end after 4/2).   In addition, please follow-up with your Primary Care Physician within 1-2 weeks after you leave the hospital

## 2021-03-26 RX ORDER — PANTOPRAZOLE SODIUM 20 MG/1
1 TABLET, DELAYED RELEASE ORAL
Qty: 8 | Refills: 0
Start: 2021-03-26 | End: 2021-04-02

## 2021-03-26 RX ORDER — DEXAMETHASONE 0.5 MG/5ML
1 ELIXIR ORAL
Qty: 8 | Refills: 0
Start: 2021-03-26 | End: 2021-04-02

## 2021-03-29 LAB
CULTURE RESULTS: SIGNIFICANT CHANGE UP
CULTURE RESULTS: SIGNIFICANT CHANGE UP
SPECIMEN SOURCE: SIGNIFICANT CHANGE UP
SPECIMEN SOURCE: SIGNIFICANT CHANGE UP

## 2021-04-05 DIAGNOSIS — R74.01 ELEVATION OF LEVELS OF LIVER TRANSAMINASE LEVELS: ICD-10-CM

## 2021-04-05 DIAGNOSIS — R53.1 WEAKNESS: ICD-10-CM

## 2021-04-05 DIAGNOSIS — E03.9 HYPOTHYROIDISM, UNSPECIFIED: ICD-10-CM

## 2021-04-05 DIAGNOSIS — J45.909 UNSPECIFIED ASTHMA, UNCOMPLICATED: ICD-10-CM

## 2021-04-05 DIAGNOSIS — U07.1 COVID-19: ICD-10-CM

## 2021-04-05 DIAGNOSIS — Z91.018 ALLERGY TO OTHER FOODS: ICD-10-CM

## 2021-04-05 DIAGNOSIS — Z79.51 LONG TERM (CURRENT) USE OF INHALED STEROIDS: ICD-10-CM

## 2021-04-05 DIAGNOSIS — J96.01 ACUTE RESPIRATORY FAILURE WITH HYPOXIA: ICD-10-CM

## 2025-01-31 NOTE — PATIENT PROFILE ADULT - OVER THE PAST TWO WEEKS HAVE YOU FELT DOWN, DEPRESSED OR HOPELESS?
----- Message from Tho Pryor MD sent at 1/31/2025  2:01 PM CST -----  Colonoscopy Findings/Path:   6 tubular adenomas removed removed (two 10-15, four small), hemoclips placed for bleeding prevention  5 polyps not removed due to the amount of adjacent polypectomies  Moderate diverticulosis in the left colon   Mild, non-bleeding radiation proctitis  Internal hemorrhoids    Recommendations: Follow up colonoscopy in 6 months - 1 year to remove additional polyps    Recommendations otherwise as outlined in colonoscopy report    This information will be conveyed to the patient and the surgical history will be updated by my clinic staff! Thank you for the referral!     no